# Patient Record
Sex: MALE | Race: WHITE
[De-identification: names, ages, dates, MRNs, and addresses within clinical notes are randomized per-mention and may not be internally consistent; named-entity substitution may affect disease eponyms.]

---

## 2017-07-07 ENCOUNTER — HOSPITAL ENCOUNTER (EMERGENCY)
Dept: HOSPITAL 76 - ED | Age: 27
Discharge: HOME | End: 2017-07-07
Payer: MEDICAID

## 2017-07-07 VITALS — SYSTOLIC BLOOD PRESSURE: 122 MMHG | DIASTOLIC BLOOD PRESSURE: 74 MMHG

## 2017-07-07 DIAGNOSIS — F17.200: ICD-10-CM

## 2017-07-07 DIAGNOSIS — J45.21: Primary | ICD-10-CM

## 2017-07-07 PROCEDURE — 99283 EMERGENCY DEPT VISIT LOW MDM: CPT

## 2017-07-07 NOTE — ED PHYSICIAN DOCUMENTATION
PD HPI DYSPNEA





- Stated complaint


Stated Complaint: ASTHMA





- Chief complaint


Chief Complaint: Resp





- History obtained from


History obtained from: Patient





- Additional information


Additional information: 





Long-standing history of asthma, never hospitalized as an adult.  Over the last 

few days he has been using his inhaler frequently.  Is on no other medications.

  Would like to consider Chantix for smoking cessation after discussion.





Review of Systems


Constitutional: denies: Fever, Chills


Throat: denies: Dental pain / toothache, Sore throat


Cardiac: denies: Chest pain / pressure, Palpitations, Pedal edema, Calf pain


Respiratory: reports: Dyspnea, Cough.  denies: Hemoptysis, Wheezing


GI: denies: Abdominal Pain





PD PAST MEDICAL HISTORY





- Past Medical History


Past Medical History: Yes


Respiratory: Asthma





- Past Surgical History


Past Surgical History: No





- Present Medications


Home Medications: 


 Ambulatory Orders











 Medication  Instructions  Recorded  Confirmed


 


Albuterol Sulfate [Proventil Hfa 1 - 2 puffs IH Q4H PRN #1 07/07/17 





Inhaler] hfa.aer.ad  


 


Fluticasone/Salmeterol [Advair 1 each IH BID #3 blst.w.dev 07/07/17 





100-50 Diskus]   


 


Varenicline Tartrate [Chantix] 0.5 mg PO DAILY 84 Days 07/07/17 


 


predniSONE [Deltasone] 60 mg PO DAILY 5 Days 07/07/17 














- Allergies


Allergies/Adverse Reactions: 


 Allergies











Allergy/AdvReac Type Severity Reaction Status Date / Time


 


No Known Drug Allergies Allergy   Verified 07/07/17 15:17














- Social History


Does the pt smoke?: Yes


Smoking Status: Current every day smoker


Does the pt drink ETOH?: No


Does the pt have substance abuse?: No





- Immunizations


Immunizations are current?: Yes





- POLST


Patient has POLST: No





PD ED PE NORMAL





- Vitals


Vital signs reviewed: Yes





- General


General: Alert and oriented X 3, No acute distress





- HEENT


HEENT: Pharynx benign





- Neck


Neck: Supple, no meningeal sign, No bony TTP





- Cardiac


Cardiac: RRR, No murmur





- Respiratory


Respiratory: Other (mild rhonchi and wheezing, good air motion)





- Abdomen


Abdomen: Non tender





- Neuro


Neuro: Alert and oriented X 3, Normal speech





- Psych


Psych: Normal mood, Normal affect





Results





- Vitals


Vitals: 


 Vital Signs - 24 hr











  07/07/17





  15:07


 


Temperature 36.9 C


 


Heart Rate 68


 


Respiratory 17





Rate 


 


Blood Pressure 122/74


 


O2 Saturation 99








 Oxygen











O2 Source                      Room air

















PD MEDICAL DECISION MAKING





- ED course


ED course: 





The patient and family were counseled as to the diagnosis and need for follow-

up.  I counseled the patient with regard to signs and symptoms that would 

necessitate an urgent reevaluation in the emergency department.  They 

understand they are welcome to return at any time if worse or if not improving 

as expected.





This document was made in part using voice recognition software.  While efforts 

are made to proofread this documents, sound alike and grammatical errors may 

occur.





Departure





- Departure


Disposition: 01 Home, Self Care


Clinical Impression: 


Asthma


Qualifiers:


 Asthma severity: mild intermittent Asthma complication type: with acute 

exacerbation Qualified Code(s): J45.21 - Mild intermittent asthma with (acute) 

exacerbation


Condition: Good


Record reviewed to determine appropriate education?: Yes


Instructions:  Asthma Dc, Varenicline oral tablets, ED Smoking Cessation


Follow-Up: 


Jareth Leigh PA-C [Credentialed Staff Provider] - Within 1 week


Prescriptions: 


Fluticasone/Salmeterol [Advair 100-50 Diskus] 1 each IH BID #3 blst.w.dev


Varenicline Tartrate [Chantix] 0.5 mg PO DAILY 84 Days


predniSONE [Deltasone] 60 mg PO DAILY 5 Days


Albuterol Sulfate [Proventil Hfa Inhaler] 1 - 2 puffs IH Q4H PRN #1 hfa.aer.ad


 PRN Reason: Cough


Comments: 


Call your doctor to arrange a follow-up appointment, make the next available 

appointment.  In the interim, return anytime if worse or if new symptoms 

develop.

## 2017-11-10 ENCOUNTER — HOSPITAL ENCOUNTER (OUTPATIENT)
Dept: HOSPITAL 76 - DI.N | Age: 27
Discharge: HOME | End: 2017-11-10
Attending: PHYSICIAN ASSISTANT
Payer: MEDICAID

## 2017-11-10 DIAGNOSIS — M25.561: Primary | ICD-10-CM

## 2017-11-10 NOTE — XRAY REPORT
THREE VIEW RIGHT KNEE: 11/10/2017 

 

CLINICAL INDICATION: Pain. 

 

FINDINGS:  AP, lateral, and sunrise views of the right knee demonstrate no evidence of fracture or di
slocation. The joint spaces are preserved. No effusion is present. 

 

IMPRESSION:  NORMAL RIGHT KNEE.

 

 

 

DD:11/10/2017 11:33:00  DT: 11/10/2017 11:51  JOB #: [id number]  EXT JOB #:[id number]

## 2020-12-18 ENCOUNTER — HOSPITAL ENCOUNTER (EMERGENCY)
Dept: HOSPITAL 76 - ED | Age: 30
Discharge: HOME | End: 2020-12-18
Payer: COMMERCIAL

## 2020-12-18 VITALS — DIASTOLIC BLOOD PRESSURE: 61 MMHG | SYSTOLIC BLOOD PRESSURE: 110 MMHG
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
## 2020-12-18 DIAGNOSIS — S30.1XXA: Primary | ICD-10-CM

## 2020-12-18 DIAGNOSIS — F17.200: ICD-10-CM

## 2020-12-18 LAB
ALBUMIN DIAFP-MCNC: 4.7 G/DL (ref 3.2–5.5)
ALBUMIN/GLOB SERPL: 2 {RATIO} (ref 1–2.2)
ALP SERPL-CCNC: 48 IU/L (ref 42–121)
ALT SERPL W P-5'-P-CCNC: 15 IU/L (ref 10–60)
ANION GAP SERPL CALCULATED.4IONS-SCNC: 9 MMOL/L (ref 6–13)
AST SERPL W P-5'-P-CCNC: 20 IU/L (ref 10–42)
BASOPHILS NFR BLD AUTO: 0.1 10^3/UL (ref 0–0.1)
BASOPHILS NFR BLD AUTO: 0.6 %
BILIRUB BLD-MCNC: 1 MG/DL (ref 0.2–1)
BUN SERPL-MCNC: 16 MG/DL (ref 6–20)
CALCIUM UR-MCNC: 9.6 MG/DL (ref 8.5–10.3)
CHLORIDE SERPL-SCNC: 104 MMOL/L (ref 101–111)
CO2 SERPL-SCNC: 27 MMOL/L (ref 21–32)
CREAT SERPLBLD-SCNC: 1 MG/DL (ref 0.6–1.2)
EOSINOPHIL # BLD AUTO: 0.1 10^3/UL (ref 0–0.7)
EOSINOPHIL NFR BLD AUTO: 0.6 %
ERYTHROCYTE [DISTWIDTH] IN BLOOD BY AUTOMATED COUNT: 11.9 % (ref 12–15)
GLOBULIN SER-MCNC: 2.4 G/DL (ref 2.1–4.2)
GLUCOSE SERPL-MCNC: 113 MG/DL (ref 70–100)
HGB UR QL STRIP: 15.6 G/DL (ref 14–18)
LIPASE SERPL-CCNC: 23 U/L (ref 22–51)
LYMPHOCYTES # SPEC AUTO: 1.8 10^3/UL (ref 1.5–3.5)
LYMPHOCYTES NFR BLD AUTO: 20.4 %
MCH RBC QN AUTO: 31.8 PG (ref 27–31)
MCHC RBC AUTO-ENTMCNC: 35.5 G/DL (ref 32–36)
MCV RBC AUTO: 89.8 FL (ref 80–94)
MONOCYTES # BLD AUTO: 0.6 10^3/UL (ref 0–1)
MONOCYTES NFR BLD AUTO: 6.7 %
NEUTROPHILS # BLD AUTO: 6.4 10^3/UL (ref 1.5–6.6)
NEUTROPHILS # SNV AUTO: 8.9 X10^3/UL (ref 4.8–10.8)
NEUTROPHILS NFR BLD AUTO: 71.4 %
PDW BLD AUTO: 9.4 FL (ref 7.4–11.4)
PLATELET # BLD: 225 10^3/UL (ref 130–450)
PROT SPEC-MCNC: 7.1 G/DL (ref 6.7–8.2)
RBC MAR: 4.9 10^6/UL (ref 4.7–6.1)
SODIUM SERPLBLD-SCNC: 140 MMOL/L (ref 135–145)

## 2020-12-18 PROCEDURE — 99283 EMERGENCY DEPT VISIT LOW MDM: CPT

## 2020-12-18 PROCEDURE — 36415 COLL VENOUS BLD VENIPUNCTURE: CPT

## 2020-12-18 PROCEDURE — 85025 COMPLETE CBC W/AUTO DIFF WBC: CPT

## 2020-12-18 PROCEDURE — 80053 COMPREHEN METABOLIC PANEL: CPT

## 2020-12-18 PROCEDURE — 83690 ASSAY OF LIPASE: CPT

## 2020-12-18 PROCEDURE — 99284 EMERGENCY DEPT VISIT MOD MDM: CPT

## 2020-12-18 PROCEDURE — 74177 CT ABD & PELVIS W/CONTRAST: CPT
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
## 2021-03-15 ENCOUNTER — HOSPITAL ENCOUNTER (EMERGENCY)
Dept: HOSPITAL 76 - ED | Age: 31
LOS: 1 days | Discharge: HOME | End: 2021-03-16
Payer: SELF-PAY

## 2021-03-15 VITALS — DIASTOLIC BLOOD PRESSURE: 51 MMHG | SYSTOLIC BLOOD PRESSURE: 117 MMHG
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
## 2021-03-15 DIAGNOSIS — F17.200: Primary | ICD-10-CM

## 2021-03-15 DIAGNOSIS — T15.02XA: ICD-10-CM

## 2021-03-15 PROCEDURE — 99283 EMERGENCY DEPT VISIT LOW MDM: CPT

## 2021-03-15 PROCEDURE — 99282 EMERGENCY DEPT VISIT SF MDM: CPT

## 2021-03-15 PROCEDURE — 65222 REMOVE FOREIGN BODY FROM EYE: CPT
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
## 2021-10-13 ENCOUNTER — HOSPITAL ENCOUNTER (EMERGENCY)
Dept: HOSPITAL 76 - ED | Age: 31
Discharge: HOME | End: 2021-10-13
Payer: COMMERCIAL

## 2021-10-13 VITALS — SYSTOLIC BLOOD PRESSURE: 110 MMHG | DIASTOLIC BLOOD PRESSURE: 55 MMHG

## 2021-10-13 DIAGNOSIS — S63.501A: Primary | ICD-10-CM

## 2021-10-13 DIAGNOSIS — F17.200: ICD-10-CM
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
## 2021-10-13 DIAGNOSIS — S16.1XXA: ICD-10-CM
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
## 2021-10-13 DIAGNOSIS — S39.012A: ICD-10-CM

## 2021-10-13 PROCEDURE — 99283 EMERGENCY DEPT VISIT LOW MDM: CPT

## 2021-10-13 PROCEDURE — 99282 EMERGENCY DEPT VISIT SF MDM: CPT
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
## 2021-10-13 NOTE — XRAY REPORT
PROCEDURE:  Wrist 4 View RT

 

INDICATIONS: Trauma

 

TECHNIQUE:  Fall views of the wrist were acquired.  

 

COMPARISON:  None

 

FINDINGS:  

 

Bones:  No fractures or dislocations.  No suspicious bony lesions.  

 

Scaphoid view:  Scaphoid is intact.

 

Soft tissues:  No suspicious soft tissue calcifications.  

 

 

IMPRESSION:  

 

No fracture. No osseous lesion. If there are persistent symptoms or continued clinical concern for pa
thology, then repeat plain film radiographs (7-10 days) or advanced imaging (CT, MR, bone scan) shoul
d be considered for further evaluation.

 

Reviewed by: Anaya Lim MD, PhD on 10/13/2021 7:27 PM PDT

Approved by: Anaya Lim MD, PhD on 10/13/2021 7:27 PM PDT

 

 

Station ID:  [id number]